# Patient Record
Sex: MALE | Race: WHITE | Employment: FULL TIME | URBAN - METROPOLITAN AREA
[De-identification: names, ages, dates, MRNs, and addresses within clinical notes are randomized per-mention and may not be internally consistent; named-entity substitution may affect disease eponyms.]

---

## 2017-04-19 ENCOUNTER — OFFICE VISIT (OUTPATIENT)
Dept: FAMILY MEDICINE CLINIC | Facility: CLINIC | Age: 22
End: 2017-04-19

## 2017-04-19 VITALS
WEIGHT: 228 LBS | BODY MASS INDEX: 33.01 KG/M2 | TEMPERATURE: 98 F | RESPIRATION RATE: 20 BRPM | HEART RATE: 94 BPM | OXYGEN SATURATION: 97 % | SYSTOLIC BLOOD PRESSURE: 122 MMHG | DIASTOLIC BLOOD PRESSURE: 84 MMHG | HEIGHT: 69.49 IN

## 2017-04-19 DIAGNOSIS — J01.00 ACUTE NON-RECURRENT MAXILLARY SINUSITIS: Primary | ICD-10-CM

## 2017-04-19 PROCEDURE — 99213 OFFICE O/P EST LOW 20 MIN: CPT | Performed by: NURSE PRACTITIONER

## 2017-04-19 RX ORDER — FLUTICASONE PROPIONATE 50 MCG
SPRAY, SUSPENSION (ML) NASAL
Qty: 1 BOTTLE | Refills: 0 | Status: SHIPPED | OUTPATIENT
Start: 2017-04-19 | End: 2017-05-28

## 2017-04-19 RX ORDER — AMOXICILLIN AND CLAVULANATE POTASSIUM 875; 125 MG/1; MG/1
1 TABLET, FILM COATED ORAL 2 TIMES DAILY
Qty: 14 TABLET | Refills: 0 | Status: SHIPPED | OUTPATIENT
Start: 2017-04-19 | End: 2017-04-26

## 2017-04-19 NOTE — PROGRESS NOTES
CHIEF COMPLAINT:   Patient presents with:  Fever: warm to touch, exposed to Pneumonia  Headache: s/s for 5 days  Cough: wet and sore throat      HPI:   Kristen Blackburn is a 25year old male who presents for sinus congestion for  5  days.  Sx began with HA an EARS: TM's clear gray, no bulging, no retraction, no fluid, bony landmarks visualized  NOSE: nostrils patent, yellow nasal mucous, nasal mucosa reddened and swollen  THROAT: oral mucosa pink, moist. No visible dental caries.  Posterior pharynx is mildly gus · Take the full course of antibiotics as instructed. Do not stop taking them, even if you feel better. · Drink plenty of water, hot tea, and other liquids. This may help thin mucus. It also may promote sinus drainage.   · Heat may help soothe painful areas Call your healthcare provider if any of these occur:  · Facial pain or headache becoming more severe  · Stiff neck  · Unusual drowsiness or confusion  · Swelling of the forehead or eyelids  · Vision problems, including blurred or double vision  · Fever of

## 2017-04-21 ENCOUNTER — TELEPHONE (OUTPATIENT)
Dept: FAMILY MEDICINE CLINIC | Facility: CLINIC | Age: 22
End: 2017-04-21

## 2017-04-21 RX ORDER — CODEINE PHOSPHATE AND GUAIFENESIN 10; 100 MG/5ML; MG/5ML
10 SOLUTION ORAL NIGHTLY PRN
Qty: 120 ML | Refills: 0 | Status: SHIPPED | OUTPATIENT
Start: 2017-04-21 | End: 2017-05-01

## 2017-04-21 NOTE — TELEPHONE ENCOUNTER
Pt seen on 4/19 for sinusitis. Reports now has a dry, hacking cough that is keeping him up all night, would like something for cough. Will rx Cheratussin to take as directed, pt will  in office.   Patient advised not to take if driving or working a

## 2017-05-28 ENCOUNTER — OFFICE VISIT (OUTPATIENT)
Dept: FAMILY MEDICINE CLINIC | Facility: CLINIC | Age: 22
End: 2017-05-28

## 2017-05-28 VITALS
BODY MASS INDEX: 33.01 KG/M2 | HEIGHT: 69.5 IN | RESPIRATION RATE: 20 BRPM | HEART RATE: 79 BPM | WEIGHT: 228 LBS | SYSTOLIC BLOOD PRESSURE: 118 MMHG | TEMPERATURE: 98 F | OXYGEN SATURATION: 98 % | DIASTOLIC BLOOD PRESSURE: 80 MMHG

## 2017-05-28 DIAGNOSIS — H92.02 OTALGIA, LEFT: Primary | ICD-10-CM

## 2017-05-28 DIAGNOSIS — J06.9 ACUTE URI: ICD-10-CM

## 2017-05-28 DIAGNOSIS — J01.00 ACUTE NON-RECURRENT MAXILLARY SINUSITIS: ICD-10-CM

## 2017-05-28 PROCEDURE — 99213 OFFICE O/P EST LOW 20 MIN: CPT | Performed by: NURSE PRACTITIONER

## 2017-05-28 RX ORDER — FLUTICASONE PROPIONATE 50 MCG
SPRAY, SUSPENSION (ML) NASAL
Qty: 1 BOTTLE | Refills: 0 | COMMUNITY
Start: 2017-05-28

## 2017-05-28 NOTE — PATIENT INSTRUCTIONS
Take Sudafed as per package directions  Restart flonase as instructed      Earache, No Infection (Adult)  Earaches can happen without an infection.  This occurs when air and fluid build up behind the eardrum causing a feeling of fullness and discomfort and · You may use over-the-counter medicine as directed to control pain, unless another medicine was prescribed. If you have chronic liver or kidney disease or ever had a stomach ulcer or GI bleeding, talk with your doctor before using these medicines.  Aspirin

## 2017-05-28 NOTE — PROGRESS NOTES
CHIEF COMPLAINT:   Patient presents with:  Ear Pain: Left ear pain for 1 week      HPI:   Tenzin Gao is a 25year old male who presents to clinic today with complaints of left ear pain x 1 week; has been waxing and waning.   Pain is described as inter NOSE: nostrils patent, clear nasal discharge, nasal mucosa mildly reddened with swelling on right > left. THROAT: oral mucosa pink, moist. Posterior pharynx is not erythematous or injected. No exudates.   NECK: supple, non-tender  LUNGS: clear to ausculta The pain or discomfort may come and go. You may hear clicking or popping sounds when you chew or swallow. You may feel that your balance is off. Or you may hear ringing in the ear.   It often takes from several weeks up to 3 months for the fluid to clear on Call your healthcare provider right away if any of the following occur:  · Your ear pain gets worse or does not start to improve   · Fever of 100.4°F (38°C) or higher, or as directed by your healthcare provider  · Fluid or blood draining from the ear  · He

## 2020-01-30 ENCOUNTER — OFFICE VISIT (OUTPATIENT)
Dept: FAMILY MEDICINE CLINIC | Facility: CLINIC | Age: 25
End: 2020-01-30
Payer: COMMERCIAL

## 2020-01-30 VITALS
RESPIRATION RATE: 18 BRPM | SYSTOLIC BLOOD PRESSURE: 128 MMHG | WEIGHT: 264 LBS | HEIGHT: 69.5 IN | BODY MASS INDEX: 38.22 KG/M2 | TEMPERATURE: 99 F | DIASTOLIC BLOOD PRESSURE: 80 MMHG | OXYGEN SATURATION: 98 % | HEART RATE: 91 BPM

## 2020-01-30 DIAGNOSIS — M54.2 NECK PAIN: Primary | ICD-10-CM

## 2020-01-30 PROCEDURE — 99213 OFFICE O/P EST LOW 20 MIN: CPT | Performed by: NURSE PRACTITIONER

## 2020-01-30 RX ORDER — NAPROXEN 500 MG/1
TABLET ORAL
Qty: 30 TABLET | Refills: 0 | Status: SHIPPED | OUTPATIENT
Start: 2020-01-30

## 2020-01-30 NOTE — PROGRESS NOTES
CHIEF COMPLAINT:     Patient presents with:  Neck Pain: RT neck pain , x 1wk sore in the morning       HPI:   Shelton Mack is a 27year old male who presents for complaint of rt neck pain for 1 week when he awakes in the morning.     Location: rt lateral lower Chyrl Scrape HEENT: moist mucous membrane, PERRLA, EOMI.    M/S: No tenderness to affected area of rt lateral neck; Trapezius tight on right side. CV:  S1 S2 No murmur, RRR  Lungs:  Normal respiratory effort. Lungs are clear. Abdomen: soft non tender.   BS x 4   DARIEL · The spinal disks may bulge and put pressure on a nearby spinal nerve. This can happen as a natural result of aging or repeated small injuries to the neck. The spinal disks are the cushions between each spinal bone.  This causes tingling, pain, or numbness · You may use over-the-counter pain medicine to control pain, unless another medicine was prescribed. If you have chronic liver or kidney disease or ever had a stomach ulcer or GI bleeding, talk with your healthcare provider before using these medicines.

## 2020-01-30 NOTE — PATIENT INSTRUCTIONS
Neck Pain  There are several possible causes of neck pain when there is no injury:  · You can get a minor ligament sprain or muscle strain from a sudden minor neck movement. Sleeping with your neck in an awkward position can also cause this.   · Some peop · Some people find relief with heat. Heat can be applied with either a warm shower or bath or a moist towel heated in the microwave and massage. Others prefer cold packs.  You can make an ice pack by filling a plastic bag that seals at the top with ice cube © 1102-8313 [employer] [address] [facility], [facility] [place] [employer]. All rights reserved. This information is not intended as a substitute for professional medical care. Always follow your healthcare professional's instructions.

## 2020-12-10 ENCOUNTER — HOSPITAL ENCOUNTER (EMERGENCY)
Age: 25
Discharge: HOME | End: 2020-12-10
Attending: EMERGENCY MEDICINE
Payer: COMMERCIAL

## 2020-12-10 ENCOUNTER — APPOINTMENT (OUTPATIENT)
Dept: GENERAL RADIOLOGY | Age: 25
End: 2020-12-10
Attending: EMERGENCY MEDICINE
Payer: COMMERCIAL

## 2020-12-10 VITALS
WEIGHT: 240 LBS | HEART RATE: 75 BPM | DIASTOLIC BLOOD PRESSURE: 73 MMHG | BODY MASS INDEX: 35 KG/M2 | OXYGEN SATURATION: 98 % | TEMPERATURE: 99 F | SYSTOLIC BLOOD PRESSURE: 128 MMHG | RESPIRATION RATE: 18 BRPM

## 2020-12-10 DIAGNOSIS — S23.41XA SPRAIN OF COSTAL CARTILAGE, INITIAL ENCOUNTER: Primary | ICD-10-CM

## 2020-12-10 PROCEDURE — 81003 URINALYSIS AUTO W/O SCOPE: CPT | Performed by: EMERGENCY MEDICINE

## 2020-12-10 PROCEDURE — 71045 X-RAY EXAM CHEST 1 VIEW: CPT | Performed by: EMERGENCY MEDICINE

## 2020-12-10 PROCEDURE — 99284 EMERGENCY DEPT VISIT MOD MDM: CPT

## 2020-12-10 PROCEDURE — 36415 COLL VENOUS BLD VENIPUNCTURE: CPT

## 2020-12-10 PROCEDURE — 85379 FIBRIN DEGRADATION QUANT: CPT | Performed by: EMERGENCY MEDICINE

## 2020-12-10 PROCEDURE — 84484 ASSAY OF TROPONIN QUANT: CPT | Performed by: EMERGENCY MEDICINE

## 2020-12-10 PROCEDURE — 85025 COMPLETE CBC W/AUTO DIFF WBC: CPT | Performed by: EMERGENCY MEDICINE

## 2020-12-10 PROCEDURE — 93005 ELECTROCARDIOGRAM TRACING: CPT

## 2020-12-10 PROCEDURE — 99285 EMERGENCY DEPT VISIT HI MDM: CPT

## 2020-12-10 PROCEDURE — 93010 ELECTROCARDIOGRAM REPORT: CPT

## 2020-12-10 PROCEDURE — 80053 COMPREHEN METABOLIC PANEL: CPT | Performed by: EMERGENCY MEDICINE

## 2020-12-11 NOTE — ED PROVIDER NOTES
Patient Seen in: THE [facility] Emergency Department In [place]      History   Patient presents with:  Abdomen/Flank Pain    Stated Complaint: abd pain    HPI    Patient is a 33-year-old male comes emergency room for evaluation of left-sided chest pain.   Yamila sclerae white, conjunctiva is pink. Oropharynx is unremarkable, no exudate. NECK: Supple, trachea midline, no lymphadenopathy. LUNG: Lungs clear to auscultation bilaterally, no wheezing, no rales, no rhonchi. CARDIOVASCULAR: Regular rate and rhythm. 12/10/2020  PROCEDURE:  XR CHEST AP PORTABLE  (CPT=71045)  TECHNIQUE:  AP chest radiograph was obtained. COMPARISON:  None.   INDICATIONS:  abd pain  PATIENT STATED HISTORY: (As transcribed by Technologist)  sudden onset left side pain, axillary and slight diagnosis)    Disposition:  Discharge  12/10/2020  7:16 pm    Follow-up:  Vivek Samaniego  [address] [address] [phone]      As needed          Medications Prescribed:  Discharge Medication List as of 12/10/2020  7:18 PM

## 2021-01-04 ENCOUNTER — APPOINTMENT (OUTPATIENT)
Dept: GENERAL RADIOLOGY | Age: 26
End: 2021-01-04
Attending: EMERGENCY MEDICINE
Payer: COMMERCIAL

## 2021-01-04 ENCOUNTER — TELEPHONE (OUTPATIENT)
Dept: URGENT CARE | Age: 26
End: 2021-01-04

## 2021-01-04 ENCOUNTER — HOSPITAL ENCOUNTER (OUTPATIENT)
Age: 26
Discharge: HOME | End: 2021-01-04
Attending: EMERGENCY MEDICINE
Payer: COMMERCIAL

## 2021-01-04 VITALS
DIASTOLIC BLOOD PRESSURE: 67 MMHG | SYSTOLIC BLOOD PRESSURE: 107 MMHG | TEMPERATURE: 97 F | OXYGEN SATURATION: 97 % | HEART RATE: 87 BPM | RESPIRATION RATE: 18 BRPM

## 2021-01-04 DIAGNOSIS — M54.50 BACK PAIN OF THORACOLUMBAR REGION: Primary | ICD-10-CM

## 2021-01-04 DIAGNOSIS — R07.81 RIB PAIN ON LEFT SIDE: ICD-10-CM

## 2021-01-04 DIAGNOSIS — M54.6 BACK PAIN OF THORACOLUMBAR REGION: Primary | ICD-10-CM

## 2021-01-04 PROCEDURE — 99213 OFFICE O/P EST LOW 20 MIN: CPT

## 2021-01-04 PROCEDURE — 99214 OFFICE O/P EST MOD 30 MIN: CPT

## 2021-01-04 PROCEDURE — 71101 X-RAY EXAM UNILAT RIBS/CHEST: CPT | Performed by: EMERGENCY MEDICINE

## 2021-01-04 PROCEDURE — 72072 X-RAY EXAM THORAC SPINE 3VWS: CPT | Performed by: EMERGENCY MEDICINE

## 2021-01-04 RX ORDER — CYCLOBENZAPRINE HCL 10 MG
10 TABLET ORAL 3 TIMES DAILY PRN
Qty: 20 TABLET | Refills: 0 | Status: SHIPPED | OUTPATIENT
Start: 2021-01-04 | End: 2021-01-11

## 2021-01-04 NOTE — ED PROVIDER NOTES
Patient Seen in: [facility]      History   Patient presents with:  Abdominal Pain: Entered by patient    Stated Complaint: Abdominal Pain    HPI/Subjective:   HPI    This is a 24-year-old male complaining of lower left rib pain.   This pat 97 %   O2 Device None (Room air)       Current:BP (!) 145/96   Pulse 102   Temp 97.3 °F (36.3 °C) (Temporal)   Resp 20   SpO2 97%         Physical Exam    Patient is alert and orient x3 in no acute distress.   HEENT exam within normal limits  Neck there is

## (undated) NOTE — LETTER
Date: 1/30/2020    Patient Name: Beth Phillips          To Whom it may concern: This letter has been written at the patient's request. The above patient was seen at the [facility] for treatment of a medical condition.     This patient prestonu

## (undated) NOTE — LETTER
Date & Time: 1/4/2021, 5:07 PM  Patient: Brian Bridges  Encounter Provider(s):    Charla Prader, MD       To Whom It May Concern:    Charmayne More was seen and treated in our department on 1/4/2021.  He should not return to work until January 7 and then

## (undated) NOTE — MR AVS SNAPSHOT
[facility]  [address] [facility] [id number] [phone]               Thank you for choosing us for your health care visit with KAYLAH Nava. We are glad to serve you and happy to provide you with this summary of your visit.   Joanne · An expectorant containing guaifenesin may help thin the mucus and promote drainage from the sinuses. · Over-the-counter decongestants may be used unless a similar medicine was prescribed.  Nasal sprays work the fastest. Use one that contains phenylephrin © 6282-2559 [address], [address] [place]. All rights reserved. This information is not intended as a substitute for professional medical care. Always follow your healthcare professional's instructions.              Al Expires: 6/18/2017 11:37 AM    If you have questions, you can call [phone] to talk to our [facility] Staff. Remember, Si [employer] is NOT to be used for urgent needs. For medical emergencies, dial 911.            Visit [employer]

## (undated) NOTE — Clinical Note
Date: 4/19/2017    Patient Name: Angela Isaac          To Whom it may concern: This letter has been written at the patient's request. The above patient was seen at the [facility] for treatment of a medical condition.     This patient prestonu

## (undated) NOTE — MR AVS SNAPSHOT
[facility]  [address] [facility] [id number] [phone]               Thank you for choosing us for your health care visit with KAYLAH Larkin. We are glad to serve you and happy to provide you with this summary of your visit.   Joanne Decongestants and antihistamines sometimes help. Antibiotics don't help since there is no infection. Your doctor may prescribe a nasal spray to help reduce swelling in the nose and eustachian tube. This can allow the ear to drain.   If your OME doesn't impr · Unusual drowsiness or confusion  Date Last Reviewed: 10/1/2016  © 2184-8698 The [address] [place] [address], [facility] [place]. All rights reserved. This information is not intended as a substitute for professional medical care.  Always Support Staff. Remember, MyChart is NOT to be used for urgent needs. For medical emergencies, dial 911.            Visit [facility] online at  [email or web address]